# Patient Record
Sex: FEMALE | URBAN - METROPOLITAN AREA
[De-identification: names, ages, dates, MRNs, and addresses within clinical notes are randomized per-mention and may not be internally consistent; named-entity substitution may affect disease eponyms.]

---

## 2021-03-25 ENCOUNTER — APPOINTMENT (OUTPATIENT)
Dept: URBAN - METROPOLITAN AREA CLINIC 264 | Age: 31
Setting detail: DERMATOLOGY
End: 2021-03-25

## 2021-03-25 DIAGNOSIS — Z41.9 ENCOUNTER FOR PROCEDURE FOR PURPOSES OTHER THAN REMEDYING HEALTH STATE, UNSPECIFIED: ICD-10-CM

## 2021-03-25 PROCEDURE — OTHER DYSPORT: OTHER

## 2021-03-25 PROCEDURE — OTHER MEDICAL CONSULTATION: DYSPORT: OTHER

## 2021-03-25 NOTE — PROCEDURE: DYSPORT
Price (Use Numbers Only, No Special Characters Or $): 272 Price (Use Numbers Only, No Special Characters Or $): 053

## 2021-04-15 ENCOUNTER — APPOINTMENT (OUTPATIENT)
Dept: URBAN - METROPOLITAN AREA CLINIC 264 | Age: 31
Setting detail: DERMATOLOGY
End: 2021-04-15

## 2021-04-15 DIAGNOSIS — Z41.9 ENCOUNTER FOR PROCEDURE FOR PURPOSES OTHER THAN REMEDYING HEALTH STATE, UNSPECIFIED: ICD-10-CM

## 2021-04-15 PROCEDURE — OTHER BOTOX: OTHER

## 2021-04-15 PROCEDURE — OTHER MEDICAL CONSULTATION: BOTOX: OTHER

## 2021-04-15 NOTE — PROCEDURE: BOTOX
Post-Care Instructions: Patient instructed to not lie down for 4 hours and limit physical activity for 24 hours. Discussed that she may need more units into her glabella as 12units is very light for this area, advised that she likely needs 20units there, especially with the strength of her movement and severity of her wrinkles. Patient aware and requests to proceed with only 12units there today.

## 2021-04-15 NOTE — PROCEDURE: BOTOX
Consent: Written consent obtained. Risks include but not limited to lid/brow ptosis, bruising, swelling, diplopia, temporary effect, incomplete chemical denervation. Patient not charged for visit due to NO improvement from last session- sample Botox used. Approved by manager Linda Alexander.